# Patient Record
Sex: MALE | Race: WHITE | NOT HISPANIC OR LATINO | Employment: FULL TIME | URBAN - METROPOLITAN AREA
[De-identification: names, ages, dates, MRNs, and addresses within clinical notes are randomized per-mention and may not be internally consistent; named-entity substitution may affect disease eponyms.]

---

## 2023-11-01 ENCOUNTER — OFFICE VISIT (OUTPATIENT)
Dept: OPTOMETRY | Facility: CLINIC | Age: 28
End: 2023-11-01
Payer: OTHER GOVERNMENT

## 2023-11-01 DIAGNOSIS — H52.13 MYOPIA OF BOTH EYES: Primary | ICD-10-CM

## 2023-11-01 PROCEDURE — 92015 DETERMINE REFRACTIVE STATE: CPT

## 2023-11-01 PROCEDURE — 92004 COMPRE OPH EXAM NEW PT 1/>: CPT

## 2023-11-01 ASSESSMENT — REFRACTION_MANIFEST
OD_SPHERE: -1.00
METHOD_AUTOREFRACTION: 1
OS_SPHERE: -1.00
OS_CYLINDER: SPHERE
OD_CYLINDER: +0.25
OD_AXIS: 076
OD_CYLINDER: SPHERE
OD_SPHERE: -0.75
OS_CYLINDER: SPHERE
OS_SPHERE: -1.00

## 2023-11-01 ASSESSMENT — CONF VISUAL FIELD
OS_SUPERIOR_TEMPORAL_RESTRICTION: 0
OS_INFERIOR_TEMPORAL_RESTRICTION: 0
OS_NORMAL: 1
OS_SUPERIOR_NASAL_RESTRICTION: 0
OD_INFERIOR_NASAL_RESTRICTION: 0
OD_NORMAL: 1
OD_INFERIOR_TEMPORAL_RESTRICTION: 0
OD_SUPERIOR_NASAL_RESTRICTION: 0
OD_SUPERIOR_TEMPORAL_RESTRICTION: 0
OS_INFERIOR_NASAL_RESTRICTION: 0

## 2023-11-01 ASSESSMENT — VISUAL ACUITY
OS_SC: 20/30
OD_SC+: -1
METHOD: SNELLEN - LINEAR
OD_SC: 20/20
OS_SC: 20/20
OD_SC: 20/20

## 2023-11-01 ASSESSMENT — EXTERNAL EXAM - RIGHT EYE: OD_EXAM: NORMAL

## 2023-11-01 ASSESSMENT — TONOMETRY
IOP_METHOD: TONOPEN
OD_IOP_MMHG: 16
OS_IOP_MMHG: 15

## 2023-11-01 ASSESSMENT — EXTERNAL EXAM - LEFT EYE: OS_EXAM: NORMAL

## 2023-11-01 ASSESSMENT — SLIT LAMP EXAM - LIDS
COMMENTS: NORMAL
COMMENTS: NORMAL

## 2023-11-01 ASSESSMENT — CUP TO DISC RATIO
OS_RATIO: 0.1
OD_RATIO: 0.2

## 2023-11-01 NOTE — PROGRESS NOTES
Chief Complaint   Patient presents with    COMPREHENSIVE EYE EXAM       Had PRK about 2015 at [facility] in [facility]  Last Eye Exam: 2015  Dilated Previously: Yes    What are you currently using to see?  does not use glasses or contacts       Distance Vision Acuity: Noticed gradual change in both eyes    Near Vision Acuity: Satisfied with vision while reading  unaided    Eye Comfort: dry  Do you use eye drops? 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       OBJECTIVE: See Ophthalmology exam    ASSESSMENT:    ICD-10-CM    1. Myopia of both eyes  H52.13           PLAN:     Patient Instructions   Myopia, both eyes: s/p PRK each eye.  Updated glasses prescription for full-time wear.  Discussed revisiting refractive surgery clinic for touch-up if desired.  Monitor annually.       Selena Melgar, OD

## 2023-11-01 NOTE — PATIENT INSTRUCTIONS
Myopia, both eyes: s/p PRK each eye.  Updated glasses prescription for full-time wear.  Discussed revisiting refractive surgery clinic for touch-up if desired.  Monitor annually.

## 2023-11-01 NOTE — LETTER
11/1/2023         RE: Bartolome Hudson  [address] [address]        Dear Colleague,    Thank you for referring your patient, Bartolome Hudson, to the [facility]. Please see a copy of my visit note below.    Chief Complaint   Patient presents with     COMPREHENSIVE EYE EXAM       Had PRK about 2015 at [facility] in [facility]  Last Eye Exam: 2015  Dilated Previously: Yes    What are you currently using to see?  does not use glasses or contacts       Distance Vision Acuity: Noticed gradual change in both eyes    Near Vision Acuity: Satisfied with vision while reading  unaided    Eye Comfort: dry  Do you use eye drops? 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       OBJECTIVE: See Ophthalmology exam    ASSESSMENT:    ICD-10-CM    1. Myopia of both eyes  H52.13           PLAN:     Patient Instructions   Myopia, both eyes: s/p PRK each eye.  Updated glasses prescription for full-time wear.  Discussed revisiting refractive surgery clinic for touch-up if desired.  Monitor annually.       Selena Melgar, NORMAN      Again, thank you for allowing me to participate in the care of your patient.        Sincerely,        Selena Melgar, OD

## 2023-11-22 ENCOUNTER — OFFICE VISIT (OUTPATIENT)
Dept: OPTOMETRY | Facility: CLINIC | Age: 28
End: 2023-11-22
Payer: OTHER GOVERNMENT

## 2023-11-22 DIAGNOSIS — H52.13 MYOPIA OF BOTH EYES: Primary | ICD-10-CM

## 2023-11-22 PROCEDURE — 99207 PR NO CHARGE LOS: CPT

## 2023-11-22 ASSESSMENT — REFRACTION
OD_SPHERE: -0.75
OS_CYLINDER: SPHERE
OD_CYLINDER: +0.25
OD_SPHERE: -0.50
OD_CYLINDER: +0.25
OS_SPHERE: -0.50
OS_SPHERE: -0.50
OS_CYLINDER: SPHERE
OD_AXIS: 075
OD_AXIS: 075

## 2023-11-22 ASSESSMENT — EXTERNAL EXAM - RIGHT EYE: OD_EXAM: NORMAL

## 2023-11-22 ASSESSMENT — SLIT LAMP EXAM - LIDS
COMMENTS: NORMAL
COMMENTS: NORMAL

## 2023-11-22 ASSESSMENT — VISUAL ACUITY
OD_SC: 20/20
OD_SC+: -1
METHOD: SNELLEN - LINEAR
OS_SC: 20/20

## 2023-11-22 ASSESSMENT — EXTERNAL EXAM - LEFT EYE: OS_EXAM: NORMAL

## 2023-11-22 ASSESSMENT — CUP TO DISC RATIO
OD_RATIO: 0.2
OS_RATIO: 0.1

## 2023-11-22 NOTE — PROGRESS NOTES
Chief Complaint   Patient presents with    DILATED EXAM        Dilation to complete 11/2/2023    Lakesha Fox Optometric Assistant, [email or web address]    Medical, surgical and family histories reviewed and updated 11/22/2023.      OBJECTIVE: See Ophthalmology exam    ASSESSMENT:    ICD-10-CM    1. Myopia of both eyes  H52.13          PLAN:    Patient Instructions   Cyclo refraction (follow-up for previous exam)          Selena Melgar, OD     
Weakness

## 2023-11-22 NOTE — LETTER
11/22/2023         RE: Bartolome Hudson  [address] [facility] [address]        Dear Colleague,    Thank you for referring your patient, Bartolome Hudson, to the [facility]. Please see a copy of my visit note below.    Chief Complaint   Patient presents with     DILATED EXAM        Dilation to complete 11/2/2023    Lakesha Fox Optometric Assistant, [employer]    Medical, surgical and family histories reviewed and updated 11/22/2023.      OBJECTIVE: See Ophthalmology exam    ASSESSMENT:    ICD-10-CM    1. Myopia of both eyes  H52.13          PLAN:    Patient Instructions   Cyclo refraction (follow-up for previous exam)          Selena Melgar, NORMAN         Again, thank you for allowing me to participate in the care of your patient.        Sincerely,        Selena Melgar, OD

## 2024-01-03 ENCOUNTER — MYC MEDICAL ADVICE (OUTPATIENT)
Dept: FAMILY MEDICINE | Facility: CLINIC | Age: 29
End: 2024-01-03
Payer: OTHER GOVERNMENT

## 2024-01-03 DIAGNOSIS — L72.3 SEBACEOUS CYST: Primary | ICD-10-CM

## 2024-01-31 ENCOUNTER — OFFICE VISIT (OUTPATIENT)
Dept: OPTOMETRY | Facility: CLINIC | Age: 29
End: 2024-01-31
Payer: OTHER GOVERNMENT

## 2024-01-31 DIAGNOSIS — H52.13 MYOPIA OF BOTH EYES: Primary | ICD-10-CM

## 2024-01-31 PROCEDURE — 92310 CONTACT LENS FITTING OU: CPT | Mod: GA

## 2024-01-31 ASSESSMENT — REFRACTION_CURRENTRX
OD_SPHERE: -0.50
OS_BASECURVE: 8.5
OD_CYLINDER: SPHERE
OS_BRAND: 1-DAY ACUVUE MOIST
OD_BASECURVE: 8.5
OD_DIAMETER: 14.2
OS_CYLINDER: SPHERE
OS_DIAMETER: 14.2
OS_SPHERE: -0.50
OD_BRAND: 1-DAY ACUVUE MOIST

## 2024-01-31 ASSESSMENT — SLIT LAMP EXAM - LIDS
COMMENTS: NORMAL
COMMENTS: NORMAL

## 2024-01-31 ASSESSMENT — VISUAL ACUITY
OD_SC: 20/20
OS_SC+: -
OS_SC: 20/20
METHOD: SNELLEN - LINEAR
OD_SC+: -

## 2024-01-31 ASSESSMENT — EXTERNAL EXAM - LEFT EYE: OS_EXAM: NORMAL

## 2024-01-31 ASSESSMENT — EXTERNAL EXAM - RIGHT EYE: OD_EXAM: NORMAL

## 2024-01-31 NOTE — LETTER
1/31/2024         RE: Bartolome Hudson  [address] [address]        Dear Colleague,    Thank you for referring your patient, Bartolome Hudson, to the [facility]. Please see a copy of my visit note below.    Assessment and Plan:  1) Myopia, both eyes  A: Previous CL wearer, s/p PRK each eye  P: Dispensed 1-Day Acuvue Moist trials and finalized CLRx.  Reviewed CL care/hygiene and wear schedule.  Patient demonstrated proficient I&R today.  Instructed patient to discontinue CL wear and seek care in the event of eye redness or pain.  Monitor annually.    Complete documentation of historical and exam elements from today's encounter can be found in the full encounter summary report (not reduplicated in this progress note). I personally obtained the chief complaint(s) and history of present illness. I confirmed and edited as necessary the review of systems, past medical/surgical history, family history, social history, and examination findings as document by others; and I examined the patient myself. I personally reviewed the relevant tests, images, and reports as documented above. I formulated and edited as necessary the assessment and plan and discussed the findings and management plan with the patient and family.    Selena Melgar OD      Again, thank you for allowing me to participate in the care of your patient.        Sincerely,        Selena Melgar OD

## 2024-01-31 NOTE — PROGRESS NOTES
Assessment and Plan:  1) Myopia, both eyes  A: Previous CL wearer, s/p PRK each eye  P: Dispensed 1-Day Acuvue Moist trials and finalized CLRx.  Reviewed CL care/hygiene and wear schedule.  Patient demonstrated proficient I&R today.  Instructed patient to discontinue CL wear and seek care in the event of eye redness or pain.  Monitor annually.    Complete documentation of historical and exam elements from today's encounter can be found in the full encounter summary report (not reduplicated in this progress note). I personally obtained the chief complaint(s) and history of present illness. I confirmed and edited as necessary the review of systems, past medical/surgical history, family history, social history, and examination findings as document by others; and I examined the patient myself. I personally reviewed the relevant tests, images, and reports as documented above. I formulated and edited as necessary the assessment and plan and discussed the findings and management plan with the patient and family.    Selena Melgar, OD

## 2024-12-15 ENCOUNTER — HEALTH MAINTENANCE LETTER (OUTPATIENT)
Age: 29
End: 2024-12-15

## 2024-12-17 ENCOUNTER — OFFICE VISIT (OUTPATIENT)
Dept: FAMILY MEDICINE | Facility: CLINIC | Age: 29
End: 2024-12-17
Payer: OTHER GOVERNMENT

## 2024-12-17 VITALS
BODY MASS INDEX: 28.19 KG/M2 | TEMPERATURE: 97.3 F | WEIGHT: 186 LBS | SYSTOLIC BLOOD PRESSURE: 112 MMHG | HEIGHT: 68 IN | RESPIRATION RATE: 16 BRPM | HEART RATE: 72 BPM | OXYGEN SATURATION: 97 % | DIASTOLIC BLOOD PRESSURE: 68 MMHG

## 2024-12-17 DIAGNOSIS — R06.83 SNORING: Primary | ICD-10-CM

## 2024-12-17 PROCEDURE — 90715 TDAP VACCINE 7 YRS/> IM: CPT | Performed by: NURSE PRACTITIONER

## 2024-12-17 PROCEDURE — 90471 IMMUNIZATION ADMIN: CPT | Performed by: NURSE PRACTITIONER

## 2024-12-17 PROCEDURE — 99213 OFFICE O/P EST LOW 20 MIN: CPT | Mod: 25 | Performed by: NURSE PRACTITIONER

## 2024-12-17 ASSESSMENT — PAIN SCALES - GENERAL: PAINLEVEL_OUTOF10: NO PAIN (0)

## 2024-12-17 NOTE — PROGRESS NOTES
Prior to immunization administration, verified patients identity using patient s name and date of birth. Please see Immunization Activity for additional information.     Screening Questionnaire for Adult Immunization    Are you sick today?   No   Do you have allergies to medications, food, a vaccine component or latex?   No   Have you ever had a serious reaction after receiving a vaccination?   No   Do you have a long-term health problem with heart, lung, kidney, or metabolic disease (e.g., diabetes), asthma, a blood disorder, no spleen, complement component deficiency, a cochlear implant, or a spinal fluid leak?  Are you on long-term aspirin therapy?   No   Do you have cancer, leukemia, HIV/AIDS, or any other immune system problem?   No   Do you have a parent, brother, or sister with an immune system problem?   No   In the past 3 months, have you taken medications that affect  your immune system, such as prednisone, other steroids, or anticancer drugs; drugs for the treatment of rheumatoid arthritis, Crohn s disease, or psoriasis; or have you had radiation treatments?   No   Have you had a seizure, or a brain or other nervous system problem?   No   During the past year, have you received a transfusion of blood or blood    products, or been given immune (gamma) globulin or antiviral drug?   No   For women: Are you pregnant or is there a chance you could become       pregnant during the next month?   No   Have you received any vaccinations in the past 4 weeks?   No     Immunization questionnaire answers were all negative.      Patient instructed to remain in clinic for 15 minutes afterwards, and to report any adverse reactions.     Screening performed by Lukasz Morales on 12/17/2024 at 3:16 PM.

## 2024-12-17 NOTE — PROGRESS NOTES
"  Assessment & Plan     Snoring  Patient has a Stop-Bang risk of 4 points which is concerning for sleep apnea.  Referral placed to sleep clinic for consult and evaluation.  - Adult Sleep Eval & Management  Referral; Future    See Patient Instructions    Subjective   Bartolome is a 29 year old, presenting for the following health issues:  Referral        12/17/2024     3:09 PM   Additional Questions   Roomed by rmb   Accompanied by self         12/17/2024     3:09 PM   Patient Reported Additional Medications   Patient reports taking the following new medications no     History of Present Illness       Reason for visit:  Sleep Apnea  Symptom onset:  More than a month  Symptoms include:  Snoring, not breathing at times when sleeping.  Symptom intensity:  Moderate  Symptom progression:  Staying the same  Had these symptoms before:  Yes  Has tried/received treatment for these symptoms:  No  What makes it worse:  No  What makes it better:  No   He is taking medications regularly.     Patient states that his partner says he snores really loud and stops breathing in his sleep.  He was told by his dentist that he is at high risk for sleep apnea and should have this checked on.      Review of Systems  CONSTITUTIONAL: NEGATIVE for fever, chills, change in weight  RESP: NEGATIVE for significant cough or SOB  CV: NEGATIVE for chest pain, palpitations or peripheral edema  PSYCHIATRIC: NEGATIVE for changes in mood or affect  ROS otherwise negative      Objective    /68   Pulse 72   Temp 97.3  F (36.3  C) (Tympanic)   Resp 16   Ht 1.727 m (5' 8\")   Wt 84.4 kg (186 lb)   SpO2 97%   BMI 28.28 kg/m    Body mass index is 28.28 kg/m .  Physical Exam   GENERAL: alert and no distress  RESP: lungs clear to auscultation - no rales, rhonchi or wheezes  CV: regular rate and rhythm, normal S1 S2, no S3 or S4, no murmur, click or rub, no peripheral edema  PSYCH: mentation appears normal, affect normal/bright    Signed " Electronically by: Grecia Doherty, NP

## 2025-02-24 ENCOUNTER — TELEPHONE (OUTPATIENT)
Dept: SLEEP MEDICINE | Facility: CLINIC | Age: 30
End: 2025-02-24
Payer: COMMERCIAL

## 2025-02-24 NOTE — TELEPHONE ENCOUNTER
Pt was called to reschedule appt with Mikey Rodriguez on 3/31/25. LVM with phone number to call asking them to call back.    Cecile Valdez    [facility]

## 2025-06-04 ASSESSMENT — SLEEP AND FATIGUE QUESTIONNAIRES
HOW LIKELY ARE YOU TO NOD OFF OR FALL ASLEEP WHILE SITTING QUIETLY AFTER LUNCH WITHOUT ALCOHOL: SLIGHT CHANCE OF DOZING
HOW LIKELY ARE YOU TO NOD OFF OR FALL ASLEEP WHILE SITTING AND READING: SLIGHT CHANCE OF DOZING
HOW LIKELY ARE YOU TO NOD OFF OR FALL ASLEEP IN A CAR, WHILE STOPPED FOR A FEW MINUTES IN TRAFFIC: SLIGHT CHANCE OF DOZING
HOW LIKELY ARE YOU TO NOD OFF OR FALL ASLEEP WHILE LYING DOWN TO REST IN THE AFTERNOON WHEN CIRCUMSTANCES PERMIT: SLIGHT CHANCE OF DOZING
HOW LIKELY ARE YOU TO NOD OFF OR FALL ASLEEP WHILE WATCHING TV: SLIGHT CHANCE OF DOZING
HOW LIKELY ARE YOU TO NOD OFF OR FALL ASLEEP WHEN YOU ARE A PASSENGER IN A CAR FOR AN HOUR WITHOUT A BREAK: SLIGHT CHANCE OF DOZING
HOW LIKELY ARE YOU TO NOD OFF OR FALL ASLEEP WHILE SITTING AND TALKING TO SOMEONE: WOULD NEVER DOZE
HOW LIKELY ARE YOU TO NOD OFF OR FALL ASLEEP WHILE SITTING INACTIVE IN A PUBLIC PLACE: WOULD NEVER DOZE

## 2025-06-04 NOTE — PROGRESS NOTES
Virtual Visit Details    Type of service:  Video Visit 11:00 AM- 11:40 AM    Originating Location (pt. Location): Home    Distant Location (provider location):  Off-site  Platform used for Video Visit: [facility]      Outpatient Sleep Medicine Consultation:      Name: Bartolome Hudson MRN# [id number]   Age: 29 year old YOB: 1995     Date of Consultation: June 4, 2025  Consultation is requested by: Grecia Doherty, NP  [address] [place] [address] Grecia Doherty  Primary care provider: Sabrina Mann       Reason for Sleep Consult:     Bartolome Hudson is sent by Grecia Doherty for a sleep consultation regarding snoring.    Patient s Reason for visit  Bartolome Hudson main reason for visit: (Patient-Rptd) No matter how much sleep I get, i always feel tired. I have woken myself up gasping for air. I am a loud snorer and sometimes stop breathing.  Patient states problem(s) started: (Patient-Rptd) Unsure; i was told by a significant other of this issue 3 years ago  Bartolome Hudson's goals for this visit: (Patient-Rptd) Understand what my options are           Assessment and Plan:     Impression/Plan:    ICD-10-CM    1. Suspected sleep apnea  R29.818 HST - Home Sleep Apnea Test - Noxturnal, T-3 Returnable      2. Snoring  R06.83 Adult Sleep Eval & Management  Referral     HST - Home Sleep Apnea Test - Noxturnal, T-3 Returnable      3. Overweight  E66.3 HST - Home Sleep Apnea Test - Noxturnal, T-3 Returnable      4. Excessive daytime sleepiness  G47.19 HST - Home Sleep Apnea Test - Noxturnal, T-3 Returnable      5. Encounter for counseling for sleep apnea  Z71.89 HST - Home Sleep Apnea Test - Noxturnal, T-3 Returnable    G47.30         Plans for Bartolome Hudson include:  A home sleep study.  Bartolome has a STOP-BANG score of 4/8 with positives for snoring, fatigue, apneas, and gender.  He has comorbidities of snoring, overweight, daytime sleepiness.  This places him at high pretest  probability for obstructive sleep apnea.  Bartolome has been asked to follow-up with a [facility] message indicating he has completed his sleep study.  Has his sleep study results become available, we will then collaborate, deciding upon the next steps in his plan of care.  - In addition, recommend patient optimize their sleep schedule as well as sleep hygiene practices to mitigate any further sleep disruption.  - Recommend they employ safe driving practices such as not driving motor vehicle should they become drowsy.  - Recommend weight management to a BMI of 30.0 or less.    45 minutes spent with patient, all of which were spent face-to-face counseling, consulting, coordinating plan of care.  The longitudinal plan of care for the diagnosis(es)/condition(s) as documented were addressed during this visit. Due to the added complexity in care, I will continue to support Bartolome in the subsequent management and with ongoing continuity of care.    RALPH Ibrahim CNP         History of Present Illness:     Bartolome Hudson presents to the sleep medicine clinic with concerns of excessive daytime sleepiness, gasp arousals, loud snoring, apnea.    Bartolome Hudson is an otherwise healthy male, albeit has an overweight BMI of 28.28.    Symptoms for past 3 years.    Symptoms of sleep initiation insomnia, sleep latency time of 1-2 hours.  1-2 awakenings per night for snort arousals.    Suffer symptoms socially disruptive snoring, snort arousals, witnessed apneas, morning mouth dryness, nasal congestion upon awakening, recurring nightmares, kicking and punching, night sweats, sore throat upon awakening.    Tonsils: In    Neck Circumference: 16    [place] Sleepiness Scale  Total score - [place]:6   (Less than 10 normal)     Insomnia Severity Scale  JIAN Total Score: 14  (normal 0-7, mild 8-14, moderate 15-21, severe 22-28)    STOP-BANG score 4/8 which places him at high pretest probability for obstructive sleep apnea.  Discussed  central sleep apnea, as well as obstructive sleep apnea pathophysiology.  Also discussed implications of untreated sleep apnea and reviewed treatments for sleep apnea, both surgical and nonsurgical.  Also discussed sleep testing process.    Social History:  [place] [employer]     Past Sleep Evaluations:  None    SLEEP-WAKE SCHEDULE:     Work/School Days: Patient goes to school/work: (Patient-Rptd) Yes   Usually gets into bed at (Patient-Rptd) 9:30pm  Takes patient about (Patient-Rptd) 1-2 hours to fall asleep   Has trouble falling asleep (Patient-Rptd) 3-4 times per week nights per week  Wakes up in the middle of the night (Patient-Rptd) Once or twice times.  Wakes up due to (Patient-Rptd) Snorting self awake   He has trouble falling back asleep (Patient-Rptd) Not much trouble times a week.   It usually takes (Patient-Rptd) 10-15 minutes to get back to sleep  Patient is usually up at (Patient-Rptd) 7am-7:10am; but i should be getting out of bed at 6:30am at the latest  Uses alarm: (Patient-Rptd) Yes    Weekends/Non-work Days/All Other Days:  Usually gets into bed at (Patient-Rptd) 10-11pm   Takes patient about (Patient-Rptd) 1 hour to fall asleep  Patient is usually up at (Patient-Rptd) 9am  Uses alarm: (Patient-Rptd) No    Sleep Need  Patient gets  (Patient-Rptd) 7-8hours per night sleep on average   Patient thinks he needs about (Patient-Rptd) Unsure. Carmenza tried 5-6 hours, 7-8, 8-10hrs i feel the same no matter what sleep    Bartolome Hudson prefers to sleep in this position(s): (Patient-Rptd) Side;Stomach   Patient states they do the following activities in bed: (Patient-Rptd) Read;Watch TV;Use phone, computer, or tablet    Naps  Patient takes a purposeful nap (Patient-Rptd) 1-2 times times a week and naps are usually (Patient-Rptd) 1 hour in duration  He feels better after a nap: (Patient-Rptd) No  He dozes off unintentionally (Patient-Rptd) None days per week  Patient has had a driving accident or near-miss  due to sleepiness/drowsiness: (Patient-Rptd) No      SLEEP DISRUPTIONS:    Breathing/Snoring  Patient snores:(Patient-Rptd) Yes  Other people complain about his snoring: (Patient-Rptd) Yes  Patient has been told he stops breathing in his sleep:(Patient-Rptd) Yes  He has issues with the following: (Patient-Rptd) Morning mouth dryness;Stuffy nose when you wake up.    Movement:  Patient gets pain, discomfort, with an urge to move:  (Patient-Rptd) No restless legs symptoms  It happens when he is resting:  (Patient-Rptd) No  It happens more at night:  (Patient-Rptd) No  Patient has been told he kicks his legs at night:  (Patient-Rptd) No     Behaviors in Sleep:  Bartolome Hudson has experienced the following behaviors while sleeping: (Patient-Rptd) Recurring Nightmares;Kicking or punching  He has experienced sudden muscle weakness during the day: (Patient-Rptd) No  Pt denies unsure bruxism, mumbles sleep talking, as a child sleep walking, and punching the wall 2-3x dream enactment behavior. Pt denies sleep paralysis, no hypnagogue and no cataplexy.       Is there anything else you would like your sleep provider to know:        CAFFEINE AND OTHER SUBSTANCES:    Patient consumes caffeinated beverages per day:  (Patient-Rptd) 20oz coffee in the morning  Last caffeine use is usually: (Patient-Rptd) 12pm  List of any prescribed or over the counter stimulants that patient takes: (Patient-Rptd) None  List of any prescribed or over the counter sleep medication patient takes: (Patient-Rptd) Melatonin, only sometimes  List of previous sleep medications that patient has tried: (Patient-Rptd) None  Patient drinks alcohol to help them sleep: (Patient-Rptd) Yes  Patient drinks alcohol near bedtime: (Patient-Rptd) No    Family History:  Patient has a family member been diagnosed with a sleep disorder: (Patient-Rptd) No        Dad snores loudly, can hear it across the house    SCALES:    EPWORTH SLEEPINESS SCALE         6/4/2025    12:31  PM    Lynch Sleepiness Scale ( LIZA Odell  [id number]<br>ESS - [place]/English - Final version - 21 Nov 07 - [facility] [place].)   Sitting and reading Slight chance of dozing   Watching TV Slight chance of dozing   Sitting, inactive in a public place (e.g. a theatre or a meeting) Would never doze   As a passenger in a car for an hour without a break Slight chance of dozing   Lying down to rest in the afternoon when circumstances permit Slight chance of dozing   Sitting and talking to someone Would never doze   Sitting quietly after a lunch without alcohol Slight chance of dozing   In a car, while stopped for a few minutes in traffic Slight chance of dozing   Lynch Score (MC) 6   Lynch Score (Sleep) 6        Patient-reported         INSOMNIA SEVERITY INDEX (JIAN)          6/4/2025    12:21 PM   Insomnia Severity Index (JIAN)   Difficulty falling asleep 2   Difficulty staying asleep 2   Problems waking up too early 0   How SATISFIED/DISSATISFIED are you with your CURRENT sleep pattern? 3   How NOTICEABLE to others do you think your sleep problem is in terms of impairing the quality of your life? 2   How WORRIED/DISTRESSED are you about your current sleep problem? 2   To what extent do you consider your sleep problem to INTERFERE with your daily functioning (e.g. daytime fatigue, mood, ability to function at work/daily chores, concentration, memory, mood, etc.) CURRENTLY? 3   JIAN Total Score 14        Patient-reported       Guidelines for Scoring/Interpretation:  Total score categories:  0-7 = No clinically significant insomnia   8-14 = Subthreshold insomnia   15-21 = Clinical insomnia (moderate severity)  22-28 = Clinical insomnia (severe)  Used via courtesy of www.myhealth.va.gov with permission from Roger Izaguirre PhD., [facility]      STOP BANG           6/5/2025    10:36 AM   STOP BANG Questionnaire (  2008, the American Society of Anesthesiologists, Inc. [employer]   B/P  "Clinic: --   BMI Clinic: 27.82         GAD7         No data to display                  CAGE-AID         No data to display                CAGE-AID reprinted with permission from the [place] Medical Journal, PAPO Cruz. and SIXTO Reyes, \"Conjoint screening questionnaires for alcohol and drug abuse\" [place] Medical Journal 94: 135-140, 1995.      PATIENT HEALTH QUESTIONNAIRE-9 (PHQ - 9)         No data to display                Developed by Genoveva Grubbs, Cheli Parks, Nav Clark and colleagues, with an educational sharon from [employer] No permission required to reproduce, translate, display or distribute.        Allergies:    No Known Allergies    Medications:    No current outpatient medications on file.       Problem List:  There are no active problems to display for this patient.       Past Medical/Surgical History:  No past medical history on file.  Past Surgical History:   Procedure Laterality Date    LASIK         Social History:  Social History     Socioeconomic History    Marital status: Single     Spouse name: Not on file    Number of children: Not on file    Years of education: Not on file    Highest education level: Not on file   Occupational History    Not on file   Tobacco Use    Smoking status: Former     Types: Cigarettes    Smokeless tobacco: Former     Types: Chew   Vaping Use    Vaping status: Never Used   Substance and Sexual Activity    Alcohol use: Yes     Comment: Max is 4-5  a weekend at the most.    Drug use: Not on file    Sexual activity: Not on file   Other Topics Concern    Not on file   Social History Narrative    Not on file     Social Drivers of Health     Financial Resource Strain: Low Risk  (10/16/2023)    Financial Resource Strain     Within the past 12 months, have you or your family members you live with been unable to get utilities (heat, electricity) when it was really needed?: No   Food Insecurity: Low Risk  (10/16/2023)    Food Insecurity     Within the " "past 12 months, did you worry that your food would run out before you got money to buy more?: No     Within the past 12 months, did the food you bought just not last and you didn t have money to get more?: No   Transportation Needs: Low Risk  (10/16/2023)    Transportation Needs     Within the past 12 months, has lack of transportation kept you from medical appointments, getting your medicines, non-medical meetings or appointments, work, or from getting things that you need?: No   Physical Activity: Not on file   Stress: Not on file   Social Connections: Not on file   Interpersonal Safety: Low Risk  (12/17/2024)    Interpersonal Safety     Do you feel physically and emotionally safe where you currently live?: Yes     Within the past 12 months, have you been hit, slapped, kicked or otherwise physically hurt by someone?: No     Within the past 12 months, have you been humiliated or emotionally abused in other ways by your partner or ex-partner?: No   Housing Stability: Low Risk  (10/16/2023)    Housing Stability     Do you have housing? : Yes     Are you worried about losing your housing?: No       Family History:  Family History   Problem Relation Age of Onset    No Known Problems Mother     No Known Problems Father     No Known Problems Sister     No Known Problems Brother        Review of Systems:  A complete review of systems reviewed by me is negative with the exeption of what has been mentioned in the history of present illness.        Physical Examination:  Vitals: Ht 1.727 m (5' 8\")   Wt 83 kg (183 lb)   BMI 27.83 kg/m    BMI= Body mass index is 27.83 kg/m .         Physical Exam  Vitals and nursing note reviewed.   Constitutional:       General: He is awake. He is not in acute distress.     Appearance: Normal appearance. He is well-developed, well-groomed and overweight. He is not ill-appearing, toxic-appearing or diaphoretic.      Comments: Engaging and interactive   HENT:      Head: Normocephalic and " "atraumatic.      Right Ear: External ear normal.      Left Ear: External ear normal.      Nose: Nose normal.      Mouth/Throat:      Mouth: Mucous membranes are moist.      Pharynx: Oropharynx is clear.   Eyes:      Conjunctiva/sclera: Conjunctivae normal.   Pulmonary:      Effort: Pulmonary effort is normal.   Musculoskeletal:      Cervical back: Neck supple.   Neurological:      General: No focal deficit present.      Mental Status: He is alert and oriented to person, place, and time.   Psychiatric:         Mood and Affect: Mood normal.         Behavior: Behavior normal. Behavior is cooperative.         Thought Content: Thought content normal.         Judgment: Judgment normal.            Physical examination is limited by the nature of a virtual visit      All Labs Personally Reviewed                     Data: All pertinent previous laboratory data reviewed     No results for input(s): \"NA\", \"POTASSIUM\", \"CHLORIDE\", \"CO2\", \"ANIONGAP\", \"GLC\", \"BUN\", \"CR\", \"CONNIE\" in the last 64363 hours.    No results for input(s): \"WBC\", \"RBC\", \"HGB\", \"HCT\", \"MCV\", \"MCH\", \"MCHC\", \"RDW\", \"PLT\" in the last 22252 hours.    No results for input(s): \"PROTTOTAL\", \"ALBUMIN\", \"BILITOTAL\", \"ALKPHOS\", \"AST\", \"ALT\", \"BILIDIRECT\" in the last 81626 hours.    No results found for: \"TSH\"    No results found for: \"UAMP\", \"UBARB\", \"BENZODIAZEUR\", \"UCANN\", \"UCOC\", \"OPIT\", \"UPCP\"    No results found for: \"IRONSAT\", \"[id number]\", \"LILLI\"    No results found for: \"PH\", \"PHARTERIAL\", \"PO2\", \"[id number]\", \"SAT\", \"PCO2\", \"HCO3\", \"BASEEXCESS\", \"MAC\", \"BEB\"    @LABRCNTIPR(phv:4,pco2v:4,po2v:4,hco3v:4,ace:4,o2per:4)@    Echocardiology: No results found for this or any previous visit (from the past 4320 hours).        Chest x-ray: No results found for this or any previous visit from the past 365 days.      Chest CT: No results found for this or any previous visit from the past 365 days.      PFT: Most Recent [facility] Pulmonary Function Testing        Summer MORALES" RALPH Macdonald CNP 6/4/2025   Sleep Medicine

## 2025-06-05 ENCOUNTER — VIRTUAL VISIT (OUTPATIENT)
Dept: SLEEP MEDICINE | Facility: CLINIC | Age: 30
End: 2025-06-05
Attending: NURSE PRACTITIONER
Payer: COMMERCIAL

## 2025-06-05 VITALS — WEIGHT: 183 LBS | HEIGHT: 68 IN | BODY MASS INDEX: 27.74 KG/M2

## 2025-06-05 DIAGNOSIS — R06.83 SNORING: ICD-10-CM

## 2025-06-05 DIAGNOSIS — G47.30 ENCOUNTER FOR COUNSELING FOR SLEEP APNEA: ICD-10-CM

## 2025-06-05 DIAGNOSIS — G47.19 EXCESSIVE DAYTIME SLEEPINESS: ICD-10-CM

## 2025-06-05 DIAGNOSIS — Z71.89 ENCOUNTER FOR COUNSELING FOR SLEEP APNEA: ICD-10-CM

## 2025-06-05 DIAGNOSIS — R29.818 SUSPECTED SLEEP APNEA: Primary | ICD-10-CM

## 2025-06-05 DIAGNOSIS — E66.3 OVERWEIGHT: ICD-10-CM

## 2025-06-05 PROCEDURE — 1126F AMNT PAIN NOTED NONE PRSNT: CPT | Mod: 95 | Performed by: NURSE PRACTITIONER

## 2025-06-05 PROCEDURE — 98002 SYNCH AUDIO-VIDEO NEW MOD 45: CPT | Performed by: NURSE PRACTITIONER

## 2025-06-05 ASSESSMENT — PAIN SCALES - GENERAL: PAINLEVEL_OUTOF10: NO PAIN (0)

## 2025-06-05 NOTE — PATIENT INSTRUCTIONS
"          MY TREATMENT INFORMATION FOR SLEEP APNEA-  Bartolome Hudson    DOCTOR : RALPH Ibrahim CNP    Am I having a sleep study at a sleep center?  --->Due to normal delays, you will be contacted within 2-4 weeks to schedule    Am I having a home sleep study?  --->Watch the video for the device you are using:    -/drop off device-   https://www.Qualys.com/watch?v=yGGFBdELGhk    -Disposable device sent out require phone/computer application-   https://www.Qualys.com/watch?v=BCce_vbiwxE      Frequently asked questions:  1. What is Obstructive Sleep Apnea (LISA)? LISA is the most common type of sleep apnea. Apnea means, \"without breath.\"  Apnea is most often caused by narrowing or collapse of the upper airway as muscles relax during sleep.   Almost everyone has occasional apneas. Most people with sleep apnea have had brief interruptions at night frequently for many years.  The severity of sleep apnea is related to how frequent and severe the events are.   2. What are the consequences of LISA? Symptoms include: feeling sleepy during the day, snoring loudly, gasping or stopping of breathing, trouble sleeping, and occasionally morning headaches or heartburn at night.  Sleepiness can be serious and even increase the risk of falling asleep while driving. Other health consequences may include development of high blood pressure and other cardiovascular disease in persons who are susceptible. Untreated LISA  can contribute to heart disease, stroke and diabetes.   3. What are the treatment options? In most situations, sleep apnea is a lifelong disease that must be managed with daily therapy. Medications are not effective for sleep apnea and surgery is generally not considered until other therapies have been tried. Your treatment is your choice . Continuous Positive Airway (CPAP) works right away and is the therapy that is effective in nearly everyone. An oral device to hold your jaw forward is usually the next " most reliable option. Other options include postioning devices (to keep you off your back), weight loss, and surgery including a tongue pacing device. There is more detail about some of these options below.  4. Are my sleep studies covered by insurance? Although we will request verification of coverage, we advise you also check in advance of the study to ensure there is coverage.    Important tips for those choosing CPAP and similar devices  REMEMBER-IF YOU RECEIVE A CALL FROM  [phone]-->IT IS TO SETUP A DEVICE  For new devices, sign up for device LUCIO to monitor your device for your followup visits  We encourage you to utilize the [employer] lucio or website ( https://[employer]/ ) to monitor your therapy progress and share the data with your healthcare team when you discuss your sleep apnea.                                                    Know your equipment:  CPAP is continuous positive airway pressure that prevents obstructive sleep apnea by keeping the throat from collapsing while you are sleeping. In most cases, the device is  smart  and can slowly self-adjusts if your throat collapses and keeps a record every day of how well you are treated-this information is available to you and your care team.  BPAP is bilevel positive airway pressure that keeps your throat open and also assists each breath with a pressure boost to maintain adequate breathing.  Special kinds of BPAP are used in patients who have inadequate breathing from lung or heart disease. In most cases, the device is  smart  and can slowly self-adjusts to assist breathing. Like CPAP, the device keeps a record of how well you are treated.  Your mask is your connection to the device. You get to choose what feels most comfortable and the staff will help to make sure if fits. Here: are some examples of the different masks that are available: Magnetic mask aids may assist with use but there are safety issues that should be addressed when  considering with magnets* ( see end of discussion).       Key points to remember on your journey with sleep apnea:  Sleep study.  PAP devices often need to be adjusted during a sleep study to show that they are effective and adjusted right.  Good tips to remember: Try wearing just the mask during a quiet time during the day so your body adapts to wearing it. A humidifier is recommended for comfort in most cases to prevent drying of your nose and throat. Allergy medication from your provider may help you if you are having nasal congestion.  Getting settled-in. It takes more than one night for most of us to get used to wearing a mask. Try wearing just the mask during a quiet time during the day so your body adapts to wearing it. A humidifier is recommended for comfort in most cases. Our team will work with you carefully on the first day and will be in contact within 4 days and again at 2 and 4 weeks for advice and remote device adjustments. Your therapy is evaluated by the device each day.   Use it every night. The more you are able to sleep naturally for 7-8 hours, the more likely you will have good sleep and to prevent health risks or symptoms from sleep apnea. Even if you use it 4 hours it helps. Occasionally all of us are unable to use a medical therapy, in sleep apnea, it is not dangerous to miss one night.   Communicate. Call our skilled team on the number provided on the first day if your visit for problems that make it difficult to wear the device. Over 2 out of 3 patients can learn to wear the device long-term with help from our team. Remember to call our team or your sleep providers if you are unable to wear the device as we may have other solutions for those who cannot adapt to mask CPAP therapy. It is recommended that you sleep your sleep provider within the first 3 months and yearly after that if you are not having problems.   Use it for your health. We encourage use of CPAP masks during daytime quiet  periods to allow your face and brain to adapt to the sensation of CPAP so that it will be a more natural sensation to awaken to at night or during naps. This can be very useful during the first few weeks or months of adapting to CPAP though it does not help medically to wear CPAP during wakefulness and  should not be used as a strategy just to meet guidelines.  Take care of your equipment. Make sure you clean your mask and tubing using directions every day and that your filter and mask are replaced as recommended or if they are not working.     *Masks with magnets:  Updated Contraindications  Masks with magnetic components are contraindicated for use by patients where they, or anyone in close physical contact while using the mask, have the following:   Active medical implants that interact with magnets (i.e., pacemakers, implantable cardioverter defibrillators (ICD), neurostimulators, cerebrospinal fluid (CSF) shunts, insulin/infusion pumps)   Metallic implants/objects containing ferromagnetic material (i.e., aneurysm clips/flow disruption devices, embolic coils, stents, valves, electrodes, implants to restore hearing or balance with implanted magnets, ocular implants, metallic splinters in the eye)  Updated Warning  Keep the mask magnets at a safe distance of at least 6 inches (150 mm) away from implants or medical devices that may be adversely affected by magnetic interference. This warning applies to you or anyone in close physical contact with your mask. The magnets are in the frame and lower headgear clips, with a magnetic field strength of up to 400mT. When worn, they connect to secure the mask but may inadvertently detach while asleep.  Implants/medical devices, including those listed within contraindications, may be adversely affected if they change function under external magnetic fields or contain ferromagnetic materials that attract/repel to magnetic fields (some metallic implants, e.g., contact lenses  with metal, dental implants, metallic cranial plates, screws, lesvia hole covers, and bone substitute devices). Consult your physician and  of your implant / other medical device for information on the potential adverse effects of magnetic fields.    BESIDES CPAP, WHAT OTHER THERAPIES ARE THERE?    Positioning Device  Positioning devices are generally used when sleep apnea is mild and only occurs on your back.This example shows a pillow that straps around the waist. It may be appropriate for those whose sleep study shows milder sleep apnea that occurs primarily when lying flat on one's back. Preliminary studies have shown benefit but effectiveness at home may need to be verified by a home sleep test. These devices are generally not covered by medical insurance.  Examples of devices that maintain sleeping on the back to prevent snoring and mild sleep apnea.    Belt type body positioner  http://[employer]/    Electronic reminder  [email or web address].[email or web address]            Oral Appliance  What is oral appliance therapy?  An oral appliance device fits on your teeth at night like a retainer used after having braces. The device is made by a specialized dentist and requires several visits over 1-2 months before a manufactured device is made to fit your teeth and is adjusted to prevent your sleep apnea. Once an oral device is working properly, snoring should be improved. A home sleep test may be recommended at that time if to determine whether the sleep apnea is adequately treated.       Some things to remember:  -Oral devices are often, but not always, covered by your medical insurance. Be sure to check with your insurance provider.   -If you are referred for oral therapy, you will be given a list of specialized dentists to consider or you may choose to visit the Web site of the American Academy of Dental Sleep Medicine  -Oral devices are less likely to work if you have severe sleep apnea or are extremely  overweight.     More detailed information  An oral appliance is a small acrylic device that fits over the upper and lower teeth  (similar to a retainer or a mouth guard). This device slightly moves jaw forward, which moves the base of the tongue forward, opens the airway, improves breathing for effective treat snoring and obstructive sleep apnea in perhaps 7 out of 10 people .  The best working devices are custom-made by a dental device  after a mold is made of the teeth 1, 2, 3.  When is an oral appliance indicated?  Oral appliance therapy is recommended as a first-line treatment for patients with primary snoring, mild sleep apnea, and for patients with moderate sleep apnea who prefer appliance therapy to use of CPAP4, 5. Severity of sleep apnea is determined by sleep testing and is based on the number of respiratory events per hour of sleep.   How successful is oral appliance therapy?  The success rate of oral appliance therapy in patients with mild sleep apnea is 75-80% while in patients with moderate sleep apnea it is 50-70%. The chance of success in patients with severe sleep apnea is 40-50%. The research also shows that oral appliances have a beneficial effect on the cardiovascular health of LISA patients at the same magnitude as CPAP therapy7.  Oral appliances should be a second-line treatment in cases of severe sleep apnea, but if not completely successful then a combination therapy utilizing CPAP plus oral appliance therapy may be effective. Oral appliances tend to be effective in a broad range of patients although studies show that the patients who have the highest success are females, younger patients, those with milder disease, and less severe obesity. 3, 6.   Finding a dentist that practices dental sleep medicine  Specific training is available through the American Academy of Dental Sleep Medicine for dentists interested in working in the field of sleep. To find a dentist who is educated in  the field of sleep and the use of oral appliances, near you, visit the Web site of the American Academy of Dental Sleep Medicine.    References  1. Masoud, et al. Objectively measured vs self-reported compliance during oral appliance therapy for sleep-disordered breathing. Chest 2013; 144(5): 2604-4314.  2. Kelsea et al. Objective measurement of compliance during oral appliance therapy for sleep-disordered breathing. Thorax 2013; 68(1): 91-96.  3. Dk et al. Mandibular advancement devices in 620 men and women with LISA and snoring: tolerability and predictors of treatment success. Chest 2004; 125: 5389-9506.  4. Maxwell, et al. Oral appliances for snoring and LISA: a review. Sleep 2006; 29: 244-262.  5. Akira et al. Oral appliance treatment for LISA: an update. J Clin Sleep Med 2014; 10(2): 215-227.  6. Jami et al. Predictors of OSAH treatment outcome. J Dent Res 2007; 86: 3021-4560.      Weight Loss:   Your Body mass index is 27.83 kg/m .    Being overweight does not necessarily mean you will have health consequences.  Those who have BMI over 30 or over 27 with existing medical conditions carries greater risk.   Weight loss decreases severity of sleep apnea in most people with obesity. For those with mild obesity who have developed snoring with weight gain, even 15-30 pound weight loss can improve and occasionally milder eliminate sleep apnea.  Structured and life-long dietary and health habits are necessary to lose weight and keep healthier weight levels.     The Comprehensive Weight loss program offers all aspects of weight loss strategies including two Non-Surgical Weight Loss Programs: Medical Weight Management and our 24 Week Healthy Lifestyle Program:  Medical Weight Management: You will meet with a Medical Weight Management Provider, as well as a Registered Dietician. The program may include medication therapy, dietary education, recommended exercise and physical therapy programs,  monthly support group meetings, and possible psychological counseling. Follow up visits with the provider or dietician are scheduled based on your progress and needs.  24 Week Healthy Lifestyle Program: This unique program is designed to give you the support of weekly appointments and activities thru a 24-week period. It may include all of the components of the basic program (above), with the addition of 11 individual Health  Visits, 24-week access to the [employer] website for over 700 online classes, and monthly support group meetings. This program has an out-of-pocket expense of $499 to cover the items that can not be billed to insurance (health coaches and broadbandchoices access), and is non-refundable/non-transferable (you may be able to use a Health Savings Account; ask your HSA provider). There may be an optional meal replacement plan prescribed as well.   Medication therapy has been approved for the treatment of sleep apnea: The FDA approved tirzepatide (ZEPBOUND) for moderate to severe sleep apnea (apnea-hypopnea index greater than or equal to 15) in patients with BMI of greater than or equal to 30, or BMI greater than or equal to 27 with at least 1 weight-related condition such as hypertension or dyslipidemia.  Surgical management achieves meaningful long-term weight loss and improvement in health risks in most patients with more severe obesity.      Sleep Apnea Surgery:    Surgery for obstructive sleep apnea is considered generally only when other therapies fail to work. Surgery may be discussed with you if you are having a difficult time tolerating CPAP and or when there is an abnormal structure that requires surgical correction.  Nose and throat surgeries often enlarge the airway to prevent collapse.  Most of these surgeries create pain for 1-2 weeks and up to half of the most common surgeries are not effective throughout life.  You should carefully discuss the benefits and drawbacks to surgery with your  sleep provider and surgeon to determine if it is the best solution for you.   More information  Surgery for LISA is directed at areas that are responsible for narrowing or complete obstruction of the airway during sleep.  There are a wide range of procedures available to enlarge and/or stabilize the airway to prevent blockage of breathing in the three major areas where it can occur: the palate, tongue, and nasal regions.  Successful surgical treatment depends on the accurate identification of the factors responsible for obstructive sleep apnea in each person.  A personalized approach is required because there is no single treatment that works well for everyone.  Because of anatomic variation, consultation with an examination by a sleep surgeon is a critical first step in determining what surgical options are best for each patient.  In some cases, examination during sedation may be recommended in order to guide the selection of procedures.  Patients will be counseled about risks and benefits as well as the typical recovery course after surgery. Surgery is typically not a cure for a person s LISA.  However, surgery will often significantly improve one s LISA severity (termed  success rate ).  Even in the absence of a cure, surgery will decrease the cardiovascular risk associated with OSA7; improve overall quality of life8 (sleepiness, functionality, sleep quality, etc).      Palate Procedures:  Patients with LISA often have narrowing of their airway in the region of their tonsils and uvula.  The goals of palate procedures are to widen the airway in this region as well as to help the tissues resist collapse.  Modern palate procedure techniques focus on tissue conservation and soft tissue rearrangement, rather than tissue removal.  Often the uvula is preserved in this procedure. Residual sleep apnea is common in patient after pharyngoplasty with an average reduction in sleep apnea events of 33%2.      Tongue  Procedures:  ExamWhile patients are awake, the muscles that surround the throat are active and keep this region open for breathing. These muscles relax during sleep, allowing the tongue and other structures to collapse and block breathing.  There are several different tongue procedures available.  Selection of a tongue base procedure depends on characteristics seen on physical exam.  Generally, procedures are aimed at removing bulky tissues in this area or preventing the back of the tongue from falling back during sleep.  Success rates for tongue surgery range from 50-62%3.    Hypoglossal Nerve Stimulation:  Hypoglossal nerve stimulation has recently received approval from the [place] Food and Drug Administration for the treatment of obstructive sleep apnea.  This is based on research showing that the system was safe and effective in treating sleep apnea6.  Results showed that the median AHI score decreased 68%, from 29.3 to 9.0. This therapy uses an implant system that senses breathing patterns and delivers mild stimulation to airway muscles, which keeps the airway open during sleep.  The system consists of three fully implanted components: a small generator (similar in size to a pacemaker), a breathing sensor, and a stimulation lead.  Using a small handheld remote, a patient turns the therapy on before bed and off upon awakening.    Candidates for this device must be greater than 18 years of age, have moderate to severe obstructive sleep apnea with less than 25% central events  (AHI between 15-65), BMI less than 35, have tried CPAP/oral appliance for at least 8 weeks without success, and have appropriate upper airway anatomy (determined by a sleep endoscopy performed by Dr. Kojo Blanc or Dr. Dominic Wong).     Nasal Procedures:  Nasal obstruction can interfere with nasal breathing during the day and night.  Studies have shown that relief of nasal obstruction can improve the ability of some patients to  tolerate positive airway pressure therapy for obstructive sleep apnea1.  Treatment options include medications such as nasal saline, topical corticosteroid and antihistamine sprays, and oral medications such as antihistamines or decongestants. Non-surgical treatments can include external nasal dilators for selected patients. If these are not successful by themselves, surgery can improve the nasal airway either alone or in combination with these other options.        Combination Procedures:  Combination of surgical procedures and other treatments may be recommended, particularly if patients have more than one area of narrowing or persistent positional disease.  The success rate of combination surgery ranges from 66-80%2,3.    References  Axel HERNADEZ. The Role of the Nose in Snoring and Obstructive Sleep Apnoea: An Update.  Eur Arch Otorhinolaryngol. 2011; 268: 1365-73.   Koffi SM; Benny JA; Gail JR; Pallanch JF; Ysabel MB; Ifeanyi SG; Pat STRATTON. Surgical modifications of the upper airway for obstructive sleep apnea in adults: a systematic review and meta-analysis. SLEEP 2010;33(10):4504-6912. Chanelle ROJAS. Hypopharyngeal surgery in obstructive sleep apnea: an evidence-based medicine review.  Arch Otolaryngol Head Neck Surg. 2006 Feb;132(2):206-13.  Chilo YH1, Malachi Y, Khoi ENRIQUETA. The efficacy of anatomically based multilevel surgery for obstructive sleep apnea. Otolaryngol Head Neck Surg. 2003 Oct;129(4):327-35.  Chanelle ROJAS, Goldberg A. Hypopharyngeal Surgery in Obstructive Sleep Apnea: An Evidence-Based Medicine Review. Arch Otolaryngol Head Neck Surg. 2006 Feb;132(2):206-13.  Sebastian PEDERSON et al. Upper-Airway Stimulation for Obstructive Sleep Apnea.  N Engl J Med. 2014 Jan 9;370(2):139-49.  Luis Y et al. Increased Incidence of Cardiovascular Disease in Middle-aged Men with Obstructive Sleep Apnea. Am J Respir Crit Care Med; 2002 166: 159-165  Addison MILLER et al. Studying Life Effects and Effectiveness of  Palatopharyngoplasty (SLEEP) study: Subjective Outcomes of Isolated Uvulopalatopharyngoplasty. Otolaryngol Head Neck Surg. 2011; 144: 623-631.        WHAT IF I ONLY HAVE SNORING?    Mandibular advancement devices, lateral sleep positioning, long-term weight loss and treatment of nasal allergies have been shown to improve snoring.  Exercising tongue muscles with a game (https://apps.[employer]/us/lucio/[employer]-reduce-snoring/st0198691536) or stimulating the tongue during the day with a device (https://doi.org/10.3390/vqi38099599) have improved snoring in some individuals.  https://www.[employer]ien/  https://www.sleepfoundation.org/best-anti-snoring-mouthpieces-and-mouthguards    Remember to Drive Safe... Drive Alive     Sleep health profoundly affects your health, mood, and your safety.  Thirty three percent of the population (one in three of us) is not getting enough sleep and many have a sleep disorder. Not getting enough sleep or having an untreated / undertreated sleep condition may make us sleepy without even knowing it. In fact, our driving could be dramatically impaired due to our sleep health. As your provider, here are some things I would like you to know about driving:     Here are some warning signs for impairment and dangerous drowsy driving:              -Having been awake more than 16 hours               -Looking tired               -Eyelid drooping              -Head nodding (it could be too late at this point)              -Driving for more than 30 minutes     Some things you could do to make the driving safer if you are experiencing some drowsiness:              -Stop driving and rest              -Call for transportation              -Make sure your sleep disorder is adequately treated     Some things that have been shown NOT to work when experiencing drowsiness while driving:              -Turning on the radio              -Opening windows              -Eating any  distracting  /  entertaining   foods (e.g., sunflower seeds, candy, or any other)              -Talking on the phone      Your decision may not only impact your life, but also the life of others. Please, remember to drive safe for yourself and all of us.

## 2025-06-05 NOTE — NURSING NOTE
Current patient location: [address] [place] [address] Dr STACEY Gunderson    Is the patient currently in the state of [place]? YES    Visit mode: VIDEO    If the visit is dropped, the patient can be reconnected by:VIDEO VISIT: Text to cell phone:   Telephone Information:   Mobile [phone]       Will anyone else be joining the visit? NO  (If patient encounters technical issues they should call [phone]    Are changes needed to the allergy or medication list? Pt stated no changes to allergies and Pt stated no med changes    Are refills needed on medications prescribed by this physician? NO    Rooming Documentation:  Questionnaire(s) completed    Reason for visit: Consult    Caitlin BERRYF